# Patient Record
Sex: MALE | Race: WHITE | Employment: OTHER | URBAN - METROPOLITAN AREA
[De-identification: names, ages, dates, MRNs, and addresses within clinical notes are randomized per-mention and may not be internally consistent; named-entity substitution may affect disease eponyms.]

---

## 2020-09-23 ENCOUNTER — HOSPITAL ENCOUNTER (OUTPATIENT)
Dept: RADIATION THERAPY | Age: 65
Discharge: HOME | End: 2020-09-23

## 2020-09-23 ENCOUNTER — RESEARCH ENCOUNTER (OUTPATIENT)
Dept: ONCOLOGY | Age: 65
End: 2020-09-23

## 2020-09-23 NOTE — PROGRESS NOTES
Met with pt to discuss clinical trial NRG-. Explanation of the study was provided by RN and Dr. Erich Hutson. Opportunities for questions and answers were provided. Pt was provided a sample consent to read over and pt stated he would call regarding participation. If pt decides to go on trial [facility]. Pt has been screened for all eligibility/ineligibility criteria and has been determined eligible for this protocol. Informed consent was reviewed by RN with patient prior to signing. All questions were answered adequately by RN or Dr. Erich Hutson. Patient signed consent today for study DCP-001. A copy of ICF given to patient. No additional questions at this time.

## 2020-10-27 ENCOUNTER — HOSPITAL ENCOUNTER (OUTPATIENT)
Dept: RADIATION THERAPY | Age: 65
Discharge: HOME | End: 2020-10-27

## 2020-10-27 ENCOUNTER — HOSPITAL ENCOUNTER (OUTPATIENT)
Dept: MRI IMAGING | Age: 65
Discharge: HOME | End: 2020-10-27
Attending: RADIOLOGY
Payer: MEDICARE

## 2020-10-27 ENCOUNTER — RESEARCH ENCOUNTER (OUTPATIENT)
Dept: ONCOLOGY | Age: 65
End: 2020-10-27

## 2020-10-27 DIAGNOSIS — C61 PROSTATE CANCER (HCC): ICD-10-CM

## 2020-10-27 PROCEDURE — 76498 UNLISTED MR PROCEDURE: CPT

## 2020-10-27 PROCEDURE — 72195 MRI PELVIS W/O DYE: CPT

## 2020-11-16 ENCOUNTER — HOSPITAL ENCOUNTER (OUTPATIENT)
Dept: RADIATION THERAPY | Age: 65
Discharge: HOME | End: 2020-11-16

## 2020-11-16 ENCOUNTER — RESEARCH ENCOUNTER (OUTPATIENT)
Dept: ONCOLOGY | Age: 65
End: 2020-11-16

## 2020-11-17 NOTE — PROGRESS NOTES
Pt in for research labs, prior to treatment. Today is fx 1 of treatment. Patient reports the following adverse events at this time: none. Patient to receive SBRT. Next appt is scheduled for 11/18/2020, fx 2.

## 2020-11-18 ENCOUNTER — HOSPITAL ENCOUNTER (OUTPATIENT)
Dept: RADIATION THERAPY | Age: 65
Discharge: HOME | End: 2020-11-18

## 2020-11-20 ENCOUNTER — HOSPITAL ENCOUNTER (OUTPATIENT)
Dept: RADIATION THERAPY | Age: 65
Discharge: HOME | End: 2020-11-20

## 2020-11-23 ENCOUNTER — RESEARCH ENCOUNTER (OUTPATIENT)
Dept: ONCOLOGY | Age: 65
End: 2020-11-23

## 2020-11-23 ENCOUNTER — HOSPITAL ENCOUNTER (OUTPATIENT)
Dept: RADIATION THERAPY | Age: 65
Discharge: HOME | End: 2020-11-23

## 2020-11-25 ENCOUNTER — HOSPITAL ENCOUNTER (OUTPATIENT)
Dept: RADIATION THERAPY | Age: 65
Discharge: HOME | End: 2020-11-25

## 2020-11-25 ENCOUNTER — RESEARCH ENCOUNTER (OUTPATIENT)
Dept: ONCOLOGY | Age: 65
End: 2020-11-25

## 2020-11-30 NOTE — PROGRESS NOTES
PT in to see MD and for last fx of treatment. Pt reports gr 1 fatigue. Follow up appointment schedule provided to pt. Pt's next follow up appointment is 5/25/2021 with Dr. Jose Conn.

## 2020-11-30 NOTE — PROGRESS NOTES
Pt in for fx 4 of treatment and last week of RT labs per protocol. Labs drawn by RT nurse w/o issue and processed per protocol. Next treatment is EOT on 11/25/2020.

## 2021-05-26 ENCOUNTER — RESEARCH ENCOUNTER (OUTPATIENT)
Dept: ONCOLOGY | Age: 66
End: 2021-05-26

## 2021-05-26 ENCOUNTER — HOSPITAL ENCOUNTER (OUTPATIENT)
Dept: RADIATION THERAPY | Age: 66
Discharge: HOME | End: 2021-05-26

## 2021-05-26 NOTE — PROGRESS NOTES
Pt in for labs and to see Dr. Timi Park. Today is his 6 mo post treatment follow-up. Patient complains of the following: erectile dysfunction gr2, urinary urgency gr1, fatigue gr1. Pt stated that he was recently prescriped Cialis for ED issues by his urologist and is working well. Pt to return for 12 month visit on 11/24/21.

## 2021-11-24 ENCOUNTER — HOSPITAL ENCOUNTER (OUTPATIENT)
Dept: RADIATION THERAPY | Age: 66
Discharge: HOME | End: 2021-11-24

## 2021-11-24 ENCOUNTER — RESEARCH ENCOUNTER (OUTPATIENT)
Dept: ONCOLOGY | Age: 66
End: 2021-11-24

## 2021-11-29 NOTE — PROGRESS NOTES
Pt in for follow up visit today per protocol with Dr. Mary Pratt. This is 12m follow up visit of treatment protocol NRG-, Research labs and QOLs completed per protocol. Next appt is scheduled for 18m visit on  5/23/2022.

## 2022-05-23 ENCOUNTER — HOSPITAL ENCOUNTER (OUTPATIENT)
Dept: RADIATION THERAPY | Age: 67
Discharge: HOME | End: 2022-05-23

## 2022-11-28 ENCOUNTER — HOSPITAL ENCOUNTER (OUTPATIENT)
Dept: RADIATION THERAPY | Age: 67
Discharge: HOME | End: 2022-11-28

## 2023-11-29 ENCOUNTER — HOSPITAL ENCOUNTER (OUTPATIENT)
Facility: HOSPITAL | Age: 68
Discharge: HOME | End: 2023-12-02

## 2023-11-29 VITALS
HEIGHT: 68 IN | SYSTOLIC BLOOD PRESSURE: 135 MMHG | DIASTOLIC BLOOD PRESSURE: 64 MMHG | BODY MASS INDEX: 27.58 KG/M2 | WEIGHT: 182 LBS | HEART RATE: 75 BPM

## 2023-11-29 DIAGNOSIS — C61 PROSTATE CANCER (HCC): ICD-10-CM

## 2023-11-29 RX ORDER — TADALAFIL 20 MG/1
20 TABLET ORAL PRN
COMMUNITY

## 2023-11-29 ASSESSMENT — PAIN SCALES - GENERAL: PAINLEVEL_OUTOF10: 0

## 2023-11-29 NOTE — PROGRESS NOTES
presence. I have reviewed the chart and agree that the record reflects my personal performance and is accurate and complete. TIME and COUNSELING:  I spent a total of 13 minutes in care of this patient on 11/29/23. PAST MEDICAL HISTORY:  Past Medical History:   Diagnosis Date    Prostate cancer ([address])     Skin cancer, basal cell     R ear         PAST SURGICAL HISTORY:  Past Surgical History:   Procedure Laterality Date    COLONOSCOPY      MOHS SURGERY      R ear. BCC         FAMILY HISTORY:   Family History   Problem Relation Age of Onset    Lung Cancer Mother        SOCIAL HISTORY:   Social History     Occupational History    Not on file   Tobacco Use    Smoking status: Never    Smokeless tobacco: Never   Substance and Sexual Activity    Alcohol use: Never    Drug use: Not on file    Sexual activity: Not on file       ALLERGIES/MEDICATIONS:  No Known Allergies  Current Outpatient Medications   Medication Sig Dispense Refill    tadalafil (CIALIS) 20 MG tablet Take 1 tablet by mouth as needed for Erectile Dysfunction       No current facility-administered medications for this encounter.

## 2024-10-31 DIAGNOSIS — C61 PROSTATE CANCER (HCC): Primary | ICD-10-CM

## 2024-11-25 DIAGNOSIS — C61 PROSTATE CANCER (HCC): ICD-10-CM

## 2024-11-26 LAB — PSA SERPL DL<=0.01 NG/ML-MCNC: 0.04 NG/ML (ref 0–4)

## 2024-12-03 ENCOUNTER — HOSPITAL ENCOUNTER (OUTPATIENT)
Facility: HOSPITAL | Age: 69
Discharge: HOME | End: 2024-12-06

## 2024-12-03 VITALS
WEIGHT: 166 LBS | DIASTOLIC BLOOD PRESSURE: 68 MMHG | RESPIRATION RATE: 16 BRPM | SYSTOLIC BLOOD PRESSURE: 135 MMHG | HEIGHT: 68 IN | BODY MASS INDEX: 25.16 KG/M2 | HEART RATE: 70 BPM

## 2024-12-03 DIAGNOSIS — C61 PROSTATE CANCER (HCC): Primary | ICD-10-CM

## 2024-12-03 ASSESSMENT — PAIN SCALES - GENERAL: PAINLEVEL_OUTOF10: 0

## 2024-12-03 NOTE — CONSULTS
RADIATION ONCOLOGY PROGRESS NOTE    Patient Name: Goran Harman Jr.  Patient YOB: 1955   Medical Record Number: [id number]  Referring Physician: Madalyn Amador MD  [address] [facility]  [address] [place] [id number]  Primary Care Provider: Lexa Diaz MD    DIAGNOSIS & STAGING:  Cancer Staging   No matching staging information was found for the patient.      ICD-10-CM    1. Prostate cancer (HCC)  C61         AJCC Staging has been reviewed      CHIEF COMPLAINT: Unfavorable intermediate risk prostate cancer, [place] 3 + 4 (total 3/12 template cores + 1/2 MRI targets positive), PSA 12.39, T1c, s/p SBRT 7.25 Gy x 5, completed 11/25/2020.   Volunteer for the clinical trial NRG , randomized to the SBRT arm.   Baseline MRI shows PIRADS 4 lesion, so eligible for study MRI if he has a PSA bounce.     RADIATION HISTORY:    Treatment Summary: Course: C1  Treatment Site Ref. ID Energy Dose/Fx (cGy) #Fx Dose Correction (cGy) Total Dose (cGy) Start Date End Date Elapsed Days   Prost_pSV [id number] 6X 725 5 / 5 0 3,625 11/16/2020 11/25/2020 9          RETURN VISITS:    5/26/2021: Today is approximately 6 months after the completion of radiation. His PSA is 0.688 ng/mL today. He denies blood in urine or stool currently. He notes he had some blood in his stool for about 4 weeks after SBRT. His urinary symptoms are not different than pre-SBRT he states, and that he never took tamsulosin. IPSS = 4, QOL = 0/delighted. TIMOTHY = 21. He is now on daily dose Cialis and this is helping a lot. He denies GI complaints.   He next sees Dr Amador 9/2/2021. My next scheduled study visit will be 11/24/2021.    11/24/2021: Today is approximately 12 months after the completion of radiation. His PSA is 0.346 ng/mL today. He denies blood in urine. His urinary symptoms remain at the same level they were pre-SBRT he states.  He never took tamsulosin.  IPSS = 4, QOL = 1/pleased. TIMOTHY = 7.  He was prescribed Cialis